# Patient Record
Sex: FEMALE | Race: WHITE | Employment: PART TIME | URBAN - METROPOLITAN AREA
[De-identification: names, ages, dates, MRNs, and addresses within clinical notes are randomized per-mention and may not be internally consistent; named-entity substitution may affect disease eponyms.]

---

## 2020-08-03 ENCOUNTER — HOSPITAL ENCOUNTER (EMERGENCY)
Age: 40
Discharge: HOME | End: 2020-08-03
Attending: EMERGENCY MEDICINE
Payer: COMMERCIAL

## 2020-08-03 ENCOUNTER — APPOINTMENT (OUTPATIENT)
Dept: CT IMAGING | Age: 40
End: 2020-08-03
Payer: COMMERCIAL

## 2020-08-03 VITALS
DIASTOLIC BLOOD PRESSURE: 82 MMHG | WEIGHT: 293 LBS | RESPIRATION RATE: 18 BRPM | HEIGHT: 63 IN | TEMPERATURE: 98.2 F | BODY MASS INDEX: 51.91 KG/M2 | HEART RATE: 63 BPM | SYSTOLIC BLOOD PRESSURE: 142 MMHG | OXYGEN SATURATION: 98 %

## 2020-08-03 PROCEDURE — 70450 CT HEAD/BRAIN W/O DYE: CPT

## 2020-08-03 PROCEDURE — 12014 RPR F/E/E/N/L/M 5.1-7.5 CM: CPT

## 2020-08-03 PROCEDURE — 6360000002 HC RX W HCPCS: Performed by: EMERGENCY MEDICINE

## 2020-08-03 PROCEDURE — 90471 IMMUNIZATION ADMIN: CPT | Performed by: EMERGENCY MEDICINE

## 2020-08-03 PROCEDURE — 90715 TDAP VACCINE 7 YRS/> IM: CPT | Performed by: EMERGENCY MEDICINE

## 2020-08-03 PROCEDURE — 99284 EMERGENCY DEPT VISIT MOD MDM: CPT

## 2020-08-03 RX ADMIN — TETANUS TOXOID, REDUCED DIPHTHERIA TOXOID AND ACELLULAR PERTUSSIS VACCINE, ADSORBED 0.5 ML: 5; 2.5; 8; 8; 2.5 SUSPENSION INTRAMUSCULAR at 21:16

## 2020-08-03 ASSESSMENT — PAIN DESCRIPTION - PAIN TYPE
TYPE: ACUTE PAIN
TYPE: ACUTE PAIN

## 2020-08-03 ASSESSMENT — PAIN - FUNCTIONAL ASSESSMENT
PAIN_FUNCTIONAL_ASSESSMENT: 0-10
PAIN_FUNCTIONAL_ASSESSMENT: ACTIVITIES ARE NOT PREVENTED

## 2020-08-03 ASSESSMENT — PAIN DESCRIPTION - LOCATION
LOCATION: HEAD
LOCATION: HEAD

## 2020-08-03 ASSESSMENT — PAIN DESCRIPTION - FREQUENCY: FREQUENCY: CONTINUOUS

## 2020-08-03 ASSESSMENT — PAIN DESCRIPTION - PROGRESSION
CLINICAL_PROGRESSION: GRADUALLY WORSENING
CLINICAL_PROGRESSION: NOT CHANGED
CLINICAL_PROGRESSION: NOT CHANGED

## 2020-08-03 ASSESSMENT — PAIN SCALES - GENERAL
PAINLEVEL_OUTOF10: 5
PAINLEVEL_OUTOF10: 4
PAINLEVEL_OUTOF10: 4

## 2020-08-03 ASSESSMENT — PAIN DESCRIPTION - DESCRIPTORS
DESCRIPTORS: ACHING;HEADACHE
DESCRIPTORS: HEADACHE

## 2020-08-03 ASSESSMENT — PAIN DESCRIPTION - ONSET: ONSET: ON-GOING

## 2020-08-04 NOTE — ED NOTES
Approx 6 cm laceration to right frontal area of scalp- cleansed with chlorhexidine and saline-- Dr Melanie García in to place six staples- awaiting CT results     Guadalupe Ortega RN  08/03/20 [id number]

## 2020-08-04 NOTE — ED PROVIDER NOTES
CHIEF COMPLAINT  Chief Complaint   Patient presents with    Laceration     right lateral forehead- states slipped- fell and horse kicked her in head- no LOC       HISTORY OF PRESENT ILLNESS  Letitia Lawler is a 45 y.o. female who presents to the ED complaining of a laceration to her right frontal scalp when a horse that she was leading reared up and hit her in the head with a front hoof. Patient reports no loss of consciousness or amnesia. Minimal headache. No neck pain or back pain. No visual changes or visual loss. No otorrhea or rhinorrhea. No epistaxis. No other facial injury. No vertigo, hearing loss, tinnitus, lightheadedness or ataxia. No focal neurologic deficits, weakness, paresthesias. No incontinence or ataxia. Tendon status is unknown. No other complaints, modifying factors or associated symptoms. Nursing notes reviewed. Past Medical History:   Diagnosis Date    Factor 5 Leiden mutation, heterozygous ([facility] [address]     Unspecified diseases of blood and blood-forming organs      Past Surgical History:   Procedure Laterality Date    APPENDECTOMY       History reviewed. No pertinent family history.   Social History     Socioeconomic History    Marital status:      Spouse name: Not on file    Number of children: Not on file    Years of education: Not on file    Highest education level: Not on file   Occupational History    Not on file   Social Needs    Financial resource strain: Not on file    Food insecurity     Worry: Not on file     Inability: Not on file    Transportation needs     Medical: Not on file     Non-medical: Not on file   Tobacco Use    Smoking status: Never Smoker   Substance and Sexual Activity    Alcohol use: Yes     Comment: occas    Drug use: No    Sexual activity: Not on file   Lifestyle    Physical activity     Days per week: Not on file     Minutes per session: Not on file    Stress: Not on file   Relationships    Social connections     Talks on phone: Not on file     Gets together: Not on file     Attends Pentecostalism service: Not on file     Active member of club or organization: Not on file     Attends meetings of clubs or organizations: Not on file     Relationship status: Not on file    Intimate partner violence     Fear of current or ex partner: Not on file     Emotionally abused: Not on file     Physically abused: Not on file     Forced sexual activity: Not on file   Other Topics Concern    Not on file   Social History Narrative    Not on file     No current facility-administered medications for this encounter. Current Outpatient Medications   Medication Sig Dispense Refill    esomeprazole Magnesium (NEXIUM) 40 MG PACK Take 40 mg by mouth daily      sertraline (ZOLOFT) 100 MG tablet Take 200 mg by mouth daily      naproxen (NAPROSYN) 250 MG tablet Take 250 mg by mouth daily      fluticasone (FLONASE) 50 MCG/ACT nasal spray 1 spray by Nasal route daily      ALPRAZolam (XANAX) 0.25 MG tablet Take 0.25 mg by mouth nightly as needed for Sleep      Multiple Vitamins-Minerals (MULTIVITAMIN PO) Take by mouth       No Known Allergies    REVIEW OF SYSTEMS  Positives and pertinent negatives as per HPI. Ten other systems were reviewed and are negative. Nursing notes pertaining to ROS were reviewed. Lac Repair    Date/Time: 8/3/2020 9:48 PM  Performed by: Ema Mendes MD  Authorized by: Ema Mendes MD     Consent:     Consent obtained:  Verbal    Consent given by:  Patient    Risks discussed:  Infection, pain, poor cosmetic result, poor wound healing and need for additional repair    Alternatives discussed:  No treatment  Anesthesia (see MAR for exact dosages):      Anesthesia method:  None  Laceration details:     Location:  Scalp    Scalp location:  Frontal    Length (cm):  6    Depth (mm):  3  Repair type:     Repair type:  Simple  Pre-procedure details:     Preparation:  Patient was prepped and draped in usual sterile fashion  Exploration:     Hemostasis achieved with:  Direct pressure    Wound exploration: entire depth of wound probed and visualized      Contaminated: no    Treatment:     Area cleansed with:  Hibiclens    Amount of cleaning:  Standard    Irrigation solution:  Sterile water    Irrigation volume:  200    Irrigation method:  Syringe  Skin repair:     Repair method:  Staples    Number of staples:  7  Approximation:     Approximation:  Close  Post-procedure details:     Dressing:  Open (no dressing)    Patient tolerance of procedure: Tolerated well, no immediate complications          PHYSICAL EXAM   BP (!) 142/82   Pulse 63   Temp 98.2 °F (36.8 °C) (Oral)   Resp 18   Ht 5' 3\" (1.6 m)   Wt (!) 305 lb 1.9 oz (138.4 kg)   LMP 06/03/2020   SpO2 98%   BMI 54.05 kg/m²   General: Alert and oriented x 3, NAD. No increased work of breathing or accessory muscle use. Non-ill appearing. Appropriate and interactive  Eyes: PERRL, no scleral icterus, injection or exudate. EOMI. HENT: Sick centimeter linear partial-thickness laceration of the right frontal scalp without bony depression, step-off or hematoma. No hemotympanum. Negative Da Silva's and raccoon sign. No epistaxis. No trismus. No facial tenderness. No mandible tenderness. No epistaxis. No foreign material or gross contamination is noted within the wound. Oral pharynx is clear, moist, no enanthem. No tonsillar hypertropy or exudate. Nasal mucous membranes are clear. TM's are clear without evidence of otitis media. Neck:  No Lymphadenopathy. Non-tender to palpation. Normal ROM. No JVD. No thyromegaly. No Mass. PULMONARY: Lungs clear bilaterally without wheezes, rales or rhonchi. Good air movement bilaterally. CV: Regular rate and rhythm without murmurs, rubs or gallops. ABD: Soft, non-tender, non-distended, normal bowel sounds, no hepatosplenomegaly, no masses. No peritoneal signs, rebound or guarding. Back:  No CVAT, no rash. EXT: No cyanosis or clubbing. No rash. CR < 2 seconds. No tenderness to palpation. No lower extremity edema. +2 pulses in upper/lower extremities bilaterally. Skin is warm and dry. PSYCH: normal affect  NEURO: Alert and oriented x 3, NAD. Interactive. GCS 15.  CN 2-12 are intact. PERRL. EOMI. Visual fields are normal.  Fundi are sharp without papilledema. 5 of 5 LE strength that is bilaterally symmetric.  5 of 5 UE strength that is bilaterally symmetric. Normal sensation to light touch of the UE and LE.  2/4 DTR of the biceps, patellar and Achilles tendons. No clonus. Normal Romberg without pronator drift. Normal finger to nose testing without past pointing. No ataxia or truncal instability. No nystagmus. RADIOLOGY    CT Head WO Contrast    (Results Pending)     No acute intracranial abnormality      ED COURSE/MDM  Closed head injury with no loss of consciousness, no amnesia or neurologic symptoms. Patient head CT was negative and was performed secondary to mechanism of injury. Patient has no neck pain or back pain. Normal neurologic exam with an NIH stroke scale of 0. No neurologic deficits. Patient's tetanus status was updated. Wound precautions were given. No evidence of foreign material.  No evidence of grossly contaminated wound. Staples out in 7 to 10 days. Hypertension:  Patient was hypertensive during the ER visit today. There are no signs of hypertensive emergency or evidence of end organ damage by history or physical exam.  These findings were discussed with the patient and advised to follow up with primary care physician to further assess and treat hypertension in the outpatient setting. The patient's blood pressure was found to be elevated according to CMS/Medicare and the Affordable Care Act/ObamaCare criteria.  Elevated blood pressure could occur because of pain or anxiety or other reasons and does not mean that they need to have their blood pressure treated or medications otherwise adjusted. However, this could also be a sign that they will need to have their blood pressure treated or medications changed. The patient was instructed to take a list of recent blood pressure readings to their next visit with their personal physician. Patient was given scripts for the following medications. I counseled patient how to take these medications. New Prescriptions    No medications on file         CLINICAL IMPRESSION  1. Injury of head, initial encounter    2. Laceration of scalp, initial encounter        Blood pressure (!) 142/82, pulse 63, temperature 98.2 °F (36.8 °C), temperature source Oral, resp. rate 18, height 5' 3\" (1.6 m), weight (!) 305 lb 1.9 oz (138.4 kg), last menstrual period 06/03/2020, SpO2 98 %.       Follow-up with:  Shayla Beverly  [address] [place] [address] [address]  [address] [facility] [address]  [phone]    In 10 days  For suture removal          Deonte Alberto MD  08/03/20 [id number]

## 2022-04-21 ENCOUNTER — HOSPITAL ENCOUNTER (EMERGENCY)
Age: 42
Discharge: HOME | End: 2022-04-21
Attending: STUDENT IN AN ORGANIZED HEALTH CARE EDUCATION/TRAINING PROGRAM
Payer: COMMERCIAL

## 2022-04-21 ENCOUNTER — APPOINTMENT (OUTPATIENT)
Dept: GENERAL RADIOLOGY | Age: 42
End: 2022-04-21
Payer: COMMERCIAL

## 2022-04-21 VITALS
RESPIRATION RATE: 17 BRPM | HEIGHT: 63 IN | HEART RATE: 73 BPM | TEMPERATURE: 98.5 F | DIASTOLIC BLOOD PRESSURE: 90 MMHG | WEIGHT: 293 LBS | BODY MASS INDEX: 51.91 KG/M2 | SYSTOLIC BLOOD PRESSURE: 177 MMHG | OXYGEN SATURATION: 95 %
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
## 2022-04-21 DIAGNOSIS — M25.531 RIGHT WRIST PAIN: ICD-10-CM

## 2022-04-21 PROCEDURE — 73110 X-RAY EXAM OF WRIST: CPT

## 2022-04-21 PROCEDURE — 73560 X-RAY EXAM OF KNEE 1 OR 2: CPT

## 2022-04-21 PROCEDURE — 99283 EMERGENCY DEPT VISIT LOW MDM: CPT

## 2022-04-21 ASSESSMENT — PAIN SCALES - GENERAL
PAINLEVEL_OUTOF10: 4
PAINLEVEL_OUTOF10: 4

## 2022-04-21 ASSESSMENT — PAIN DESCRIPTION - FREQUENCY
FREQUENCY: CONTINUOUS
FREQUENCY: CONTINUOUS

## 2022-04-21 ASSESSMENT — PAIN DESCRIPTION - PAIN TYPE
TYPE: ACUTE PAIN
TYPE: ACUTE PAIN

## 2022-04-21 ASSESSMENT — PAIN DESCRIPTION - DESCRIPTORS
DESCRIPTORS: ACHING
DESCRIPTORS: ACHING

## 2022-04-21 ASSESSMENT — PAIN DESCRIPTION - ORIENTATION
ORIENTATION: RIGHT
ORIENTATION: RIGHT

## 2022-04-21 ASSESSMENT — PAIN - FUNCTIONAL ASSESSMENT
PAIN_FUNCTIONAL_ASSESSMENT: PREVENTS OR INTERFERES SOME ACTIVE ACTIVITIES AND ADLS
PAIN_FUNCTIONAL_ASSESSMENT: PREVENTS OR INTERFERES SOME ACTIVE ACTIVITIES AND ADLS

## 2022-04-21 ASSESSMENT — PAIN DESCRIPTION - LOCATION
LOCATION: WRIST
LOCATION: WRIST

## 2022-04-21 NOTE — ED TRIAGE NOTES
Patient states at 1100 this morning she rolled her left ankle and fell on her right wrist and bilateral knees. Continued to work, but with discomfort to her right wrist and bilateral knees. She has not taken anything for the pain. Has not applied ice to any of the sore areas. Right radial pulse strongly palpable. No obvious swelling or bruising at this time. Limited mobility due to the pain.

## 2022-04-21 NOTE — Clinical Note
Rica Smith was seen and treated in our emergency department on 4/21/2022. She may return to work on 04/25/2022. Was seen in the emergency room after a fall. We were concerned for knee and wrist injury. We recommend that you follow-up with your occupational health to be cleared for further activities. If you have any questions or concerns, please don't hesitate to call.       Janeen Perrin MD

## 2022-04-21 NOTE — ED PROVIDER NOTES
Primary Care Physician: Magdiel Hillman   Attending Physician: No att. providers found     History   Chief Complaint   Patient presents with    Fall     States at 1100 this morning she rolled her left ankle and fell on her right wrist and bilateral knees. CONOR Tilley  is a 40 y.o. female who presents complaining of right knee and right ankle pain. Patient stated that she was at work when she twisted her ankle and fell with her wrist.  She did not hit her head and no LOC. She stated her wrist and knee now hurts. Came to the emergency room to evaluated. Otherwise no other complaints. Past Medical History:   Diagnosis Date    Depression     Factor 5 Leiden mutation, heterozygous ([facility] [address]     Post-traumatic stress disorder, unspecified     Unspecified diseases of blood and blood-forming organs         Past Surgical History:   Procedure Laterality Date    APPENDECTOMY          No family history on file. Social History     Socioeconomic History    Marital status:      Spouse name: Not on file    Number of children: Not on file    Years of education: Not on file    Highest education level: Not on file   Occupational History    Not on file   Tobacco Use    Smoking status: Never Smoker    Smokeless tobacco: Never Used   Substance and Sexual Activity    Alcohol use: Yes     Comment: rarely    Drug use: No    Sexual activity: Not on file   Other Topics Concern    Not on file   Social History Narrative    Not on file     Social Determinants of Health     Financial Resource Strain:     Difficulty of Paying Living Expenses: Not on file   Food Insecurity:     Worried About Running Out of Food in the Last Year: Not on file    Pura of Food in the Last Year: Not on file   Transportation Needs:     Lack of Transportation (Medical): Not on file    Lack of Transportation (Non-Medical):  Not on file   Physical Activity:     Days of Exercise per Week: Not on file    Minutes of Exercise per Session: Not on file   Stress:     Feeling of Stress : Not on file   Social Connections:     Frequency of Communication with Friends and Family: Not on file    Frequency of Social Gatherings with Friends and Family: Not on file    Attends Protestant Services: Not on file    Active Member of [address] [place] [address] or Organizations: Not on file    Attends Club or Organization Meetings: Not on file    Marital Status: Not on file   Intimate Partner Violence:     Fear of Current or Ex-Partner: Not on file    Emotionally Abused: Not on file    Physically Abused: Not on file    Sexually Abused: Not on file   Housing Stability:     Unable to Pay for Housing in the Last Year: Not on file    Number of [place] in the Last Year: Not on file    Unstable Housing in the Last Year: Not on file        Review of Systems   10 total systems reviewed and found to be negative unless otherwise noted in HPI     Physical Exam   BP (!) 177/90   Pulse 73   Temp 98.5 °F (36.9 °C) (Tympanic)   Resp 17   Ht 5' 3\" (1.6 m)   Wt (!) 307 lb 5.1 oz (139.4 kg)   LMP 03/28/2022 (Approximate)   SpO2 95%   BMI 54.44 kg/m²      CONSTITUTIONAL: Well appearing, in no acute distress   HEAD: atraumatic, normocephalic   EYES: PERRL, No injection, discharge or scleral icterus. ENT: Moist mucous membranes. NECK: Normal ROM, NO LAD   CARDIOVASCULAR: Regular rate and rhythm. No murmurs or gallop. PULMONARY/CHEST: Airway patent. No retractions. Breath sounds clear with good air entry bilaterally. ABDOMEN: Soft, Non-distended and non-tender, without guarding or rebound. SKIN: Acyanotic, warm, dry   MUSCULOSKELETAL: No swelling, tenderness or deformity   NEUROLOGICAL: Awake and oriented x 3. Pulses intact. Grossly nonfocal   Nursing note and vitals reviewed.      ED Course & Medical Decision Making   Medications - No data to display   Labs Reviewed - No data to display   XR WRIST RIGHT (MIN 3 VIEWS)   Final Result   No acute osseous abnormality of the right wrist.         XR KNEE RIGHT (1-2 VIEWS)   Final Result   No acute osseous abnormality. Mild patellofemoral osteoarthritis              PROCEDURES:   Procedures    ASSESSMENT AND PLAN:  [id number], Dakota Medel is a 40 y.o. female who presents complaining of right knee and right ankle pain. Patient stated that she was at work when she twisted her ankle and fell with her wrist.  She did not hit her head and no LOC. She stated her wrist and knee now hurts. Came to the emergency room to evaluated. Otherwise no other complaints. On exam patient appeared to be in no distress she was tender to palpation on the right wrist and knee. I did obtain x-rays which were negative. I did not think she needed labs given no signs of infection and this was all acute. I believe that her symptoms are secondary to sprain. Patient is stable discharged home with recommendation to follow-up with her primary care doctor. ClINICAL IMPRESSION:  1. Fall, initial encounter    2. Right wrist pain          PATIENT REFERRED TO:  Rabia Toney  [address] [facility] [address]  [address] [facility] [address]  [phone]    Schedule an appointment as soon as possible for a visit in 2 days      [address] [phone]  Schedule an appointment as soon as possible for a visit in 2 days        DISCHARGE MEDICATIONS:  Discharge Medication List as of 4/21/2022  4:11 PM        DISCONTINUED MEDICATIONS:  Discharge Medication List as of 4/21/2022  4:11 PM        DISPOSITION Decision To Discharge 04/21/2022 04:02:23 PM  -We have instructed the patient, (Dakota Medel) to return to the ED or call her PCP if her pain/symptoms worsen. -Findings and recommendations explained to patient.  She expressed understanding and agreed with the plan.    ___________________________________________________________________________________  _________________________________________________________________________________________  This record is transcribed utilizing voice recognition technology. There are inherent limitations in this technology. In addition, there may be limitations in editing of this report. If there are any discrepancies, please contact me directly.         Lauren Joseph MD  04/21/22 [id number]